# Patient Record
Sex: FEMALE | Race: WHITE | NOT HISPANIC OR LATINO | Employment: FULL TIME | URBAN - NONMETROPOLITAN AREA
[De-identification: names, ages, dates, MRNs, and addresses within clinical notes are randomized per-mention and may not be internally consistent; named-entity substitution may affect disease eponyms.]

---

## 2017-01-03 DIAGNOSIS — B00.1 HERPES LABIALIS: ICD-10-CM

## 2017-01-03 RX ORDER — VALACYCLOVIR HYDROCHLORIDE 1 G/1
TABLET, FILM COATED ORAL
Qty: 4 TABLET | Refills: 0 | Status: SHIPPED | OUTPATIENT
Start: 2017-01-03 | End: 2017-03-17 | Stop reason: SDUPTHER

## 2017-01-03 NOTE — TELEPHONE ENCOUNTER
Pt has not been seen in office sine March 2016. Pt does not have follow up scheduled. Pt was asked to schedule follow up 10/2016, did not follow through

## 2017-03-14 PROBLEM — B00.1 HERPES LABIALIS: Status: ACTIVE | Noted: 2017-03-14

## 2017-03-14 PROBLEM — B97.7 HPV (HUMAN PAPILLOMA VIRUS) INFECTION: Status: ACTIVE | Noted: 2017-03-14

## 2017-03-17 DIAGNOSIS — B00.1 HERPES LABIALIS: ICD-10-CM

## 2017-03-17 RX ORDER — VALACYCLOVIR HYDROCHLORIDE 1 G/1
TABLET, FILM COATED ORAL
Qty: 4 TABLET | Refills: 0 | Status: SHIPPED | OUTPATIENT
Start: 2017-03-17 | End: 2019-02-13

## 2019-02-13 ENCOUNTER — PROCEDURE VISIT (OUTPATIENT)
Dept: OBSTETRICS AND GYNECOLOGY | Facility: CLINIC | Age: 39
End: 2019-02-13

## 2019-02-13 VITALS
SYSTOLIC BLOOD PRESSURE: 112 MMHG | BODY MASS INDEX: 24.01 KG/M2 | WEIGHT: 153 LBS | HEIGHT: 67 IN | DIASTOLIC BLOOD PRESSURE: 66 MMHG

## 2019-02-13 DIAGNOSIS — Z01.419 ENCOUNTER FOR GYNECOLOGICAL EXAMINATION WITHOUT ABNORMAL FINDING: Primary | ICD-10-CM

## 2019-02-13 PROCEDURE — 99396 PREV VISIT EST AGE 40-64: CPT | Performed by: OBSTETRICS & GYNECOLOGY

## 2019-02-13 NOTE — PROGRESS NOTES
Subjective   Chief Complaint   Patient presents with   • Gynecologic Exam     Last pap 2016 WNl, Hx of abnormal paps, Cryo     Cristal Fleming is a 38 y.o. year old  presenting to be seen for her annual exam. Cycles  usally regular q 27 days. Last month had one week premenstrual bleeding leading up to cycle.     SEXUAL Hx:  She is currently sexually active.  In the past year there has not been new sexual partners.    Condoms are not typically used.  She would not like to be screened for STD's at today's exam.  Current birth control method: vasectomy.  MENSTRUAL Hx:  Patient's last menstrual period was 2019.  In the past 6 months her cycles have been regular, predictable and occur monthly.   Her menstrual flow is normal.   Each month on average there are roughly 0 days of very heavy flow.    Intermenstrual bleeding is absent.    Post-coital bleeding is absent.  Dysmenorrhea: is not affecting her activities of daily living  PMS: is not affecting her activities of daily living  Her cycles are not a source of concern for her that she wishes to discuss today.  HEALTH Hx:  She exercises regularly: no (and has no plans to become more active).  She wears her seat belt:yes.  She has concerns about domestic violence: no.  OTHER COMPLAINTS:  Nothing else     The following portions of the patient's history were reviewed and updated as appropriate:  She  has a past medical history of Rectal discomfort.  She does not have any pertinent problems on file.  She  has a past surgical history that includes Breast surgery and Other surgical history.  Her family history includes Breast cancer in her paternal aunt; Cancer in her paternal grandfather; Diabetes in her maternal grandfather and maternal grandmother; Heart disease in her paternal grandmother; Lung cancer in her paternal aunt.  She  reports that  has never smoked. she has never used smokeless tobacco. She reports that she drinks alcohol. She reports that she does not  "use drugs.  Current Outpatient Medications   Medication Sig Dispense Refill   • hydrocortisone (PROCTOSOL HC) 2.5 % rectal cream Proctosol HC 2.5 % Rectal Cream; Patient Sig: Proctosol HC 2.5 % Rectal Cream apply to outside of rectum twice daily as needed for pain, and after BMs. use for only 1 week; 15; 0; 07-Mar-2016; Active       No current facility-administered medications for this visit.      Current Outpatient Medications on File Prior to Visit   Medication Sig   • hydrocortisone (PROCTOSOL HC) 2.5 % rectal cream Proctosol HC 2.5 % Rectal Cream; Patient Sig: Proctosol HC 2.5 % Rectal Cream apply to outside of rectum twice daily as needed for pain, and after BMs. use for only 1 week; 15; 0; 07-Mar-2016; Active   • [DISCONTINUED] valACYclovir (VALTREX) 1000 MG tablet TAKE TWO TABLETS BY MOUTH AT FIRST SIGN OF OUTBREAK AND THEN REPEAT IN 12 HOURS     No current facility-administered medications on file prior to visit.      She has No Known Allergies..    Social History    Tobacco Use      Smoking status: Never Smoker      Smokeless tobacco: Never Used    Review of Systems  Consitutional NEG: anorexia or night sweats    POS: nothing reported   Gastointestinal NEG: bloating, change in bowel habits, melena or reflux symptoms    POS: nothing reported   Genitourinary NEG: dysuria or hematuria    POS: nothing reported   Integument NEG: moles that are changing in size, shape, color or rashes    POS: nothing reported   Breast NEG: persistent breast lump, skin dimpling or nipple discharge    POS: nothing reported          Objective   /66   Ht 170.2 cm (67\")   Wt 69.4 kg (153 lb)   LMP 01/27/2019   BMI 23.96 kg/m²     General:  well developed; well nourished  no acute distress   Skin:  No suspicious lesions seen   Thyroid: normal to inspection and palpation   Breasts:  Examined in supine position  Symmetric without masses or skin dimpling  Nipples normal without inversion, lesions or discharge  There are no " palpable axillary nodes   Abdomen: soft, non-tender; no masses  no umbilical or inguinal hernias are present  no hepato-splenomegaly   Pelvis: Clinical staff was present for exam  External genitalia:  normal appearance of the external genitalia including Bartholin's and [place]'s glands.  :  urethral meatus normal;  Vaginal:  normal pink mucosa without prolapse or lesions.  Cervix:  normal appearance.  Uterus:  normal size, shape and consistency.  Adnexa:  normal bimanual exam of the adnexa.  Rectal:  digital rectal exam not performed; anus visually normal appearing.        Assessment   1. Normal PE     Plan   1. PAP done  2. MMG  3. Follow up     No orders of the defined types were placed in this encounter.         This note was electronically signed.      February 13, 2019

## 2019-02-19 DIAGNOSIS — Z01.419 ENCOUNTER FOR GYNECOLOGICAL EXAMINATION WITHOUT ABNORMAL FINDING: ICD-10-CM

## 2019-03-18 ENCOUNTER — TELEPHONE (OUTPATIENT)
Dept: OBSTETRICS AND GYNECOLOGY | Facility: CLINIC | Age: 39
End: 2019-03-18

## 2019-03-18 NOTE — TELEPHONE ENCOUNTER
Contacted patient regarding overdue MMG. She was transferred to Central Scheduling to get that scheduled. Order postponed

## 2019-03-26 ENCOUNTER — APPOINTMENT (OUTPATIENT)
Dept: MAMMOGRAPHY | Facility: HOSPITAL | Age: 39
End: 2019-03-26

## 2019-03-27 ENCOUNTER — HOSPITAL ENCOUNTER (OUTPATIENT)
Dept: MAMMOGRAPHY | Facility: HOSPITAL | Age: 39
Discharge: HOME | End: 2019-03-27
Admitting: OBSTETRICS & GYNECOLOGY

## 2019-03-27 DIAGNOSIS — Z01.419 ENCOUNTER FOR GYNECOLOGICAL EXAMINATION WITHOUT ABNORMAL FINDING: ICD-10-CM

## 2019-03-27 PROCEDURE — 77063 BREAST TOMOSYNTHESIS BI: CPT

## 2019-03-27 PROCEDURE — 77067 SCR MAMMO BI INCL CAD: CPT

## 2023-05-15 ENCOUNTER — TRANSCRIBE ORDERS (OUTPATIENT)
Dept: ADMINISTRATIVE | Facility: HOSPITAL | Age: 43
End: 2023-05-15
Payer: COMMERCIAL

## 2023-05-15 DIAGNOSIS — Z12.31 VISIT FOR SCREENING MAMMOGRAM: Primary | ICD-10-CM

## 2023-11-16 ENCOUNTER — HOSPITAL ENCOUNTER (OUTPATIENT)
Dept: MAMMOGRAPHY | Facility: HOSPITAL | Age: 43
Discharge: HOME | End: 2023-11-16
Admitting: OBSTETRICS & GYNECOLOGY
Payer: COMMERCIAL

## 2023-11-16 DIAGNOSIS — Z12.31 VISIT FOR SCREENING MAMMOGRAM: ICD-10-CM

## 2023-11-16 PROCEDURE — 77067 SCR MAMMO BI INCL CAD: CPT

## 2023-11-16 PROCEDURE — 77063 BREAST TOMOSYNTHESIS BI: CPT

## 2024-07-26 ENCOUNTER — TRANSCRIBE ORDERS (OUTPATIENT)
Dept: ADMINISTRATIVE | Facility: HOSPITAL | Age: 44
End: 2024-07-26
Payer: COMMERCIAL

## 2024-07-26 ENCOUNTER — HOSPITAL ENCOUNTER (OUTPATIENT)
Dept: ULTRASOUND IMAGING | Facility: HOSPITAL | Age: 44
Discharge: HOME | End: 2024-07-26
Payer: COMMERCIAL

## 2024-07-26 DIAGNOSIS — R60.0 LOCALIZED EDEMA: Primary | ICD-10-CM

## 2024-07-26 DIAGNOSIS — R60.0 LOCALIZED EDEMA: ICD-10-CM

## 2024-07-26 DIAGNOSIS — M79.605 LEFT LEG PAIN: ICD-10-CM

## 2024-07-26 PROCEDURE — 93971 EXTREMITY STUDY: CPT

## 2025-03-27 ENCOUNTER — OFFICE VISIT (OUTPATIENT)
Dept: OBSTETRICS AND GYNECOLOGY | Facility: CLINIC | Age: 45
End: 2025-03-27
Payer: OTHER GOVERNMENT

## 2025-03-27 VITALS
BODY MASS INDEX: 26.65 KG/M2 | WEIGHT: 169.8 LBS | DIASTOLIC BLOOD PRESSURE: 60 MMHG | SYSTOLIC BLOOD PRESSURE: 102 MMHG | HEIGHT: 67 IN

## 2025-03-27 DIAGNOSIS — Z01.419 WOMEN'S ANNUAL ROUTINE GYNECOLOGICAL EXAMINATION: Primary | ICD-10-CM

## 2025-03-27 DIAGNOSIS — N89.8 VAGINAL ODOR: ICD-10-CM

## 2025-03-27 PROCEDURE — 99386 PREV VISIT NEW AGE 40-64: CPT | Performed by: OBSTETRICS & GYNECOLOGY

## 2025-03-27 NOTE — PROGRESS NOTES
Gynecologic Annual Exam Note          GYN Annual Exam     Gynecologic Exam and Establish Care        Subjective     HPI  Cristal Fleming is a 44 y.o. female, , who presents for annual well woman exam as a new patient. There were no changes to her medical or surgical history since her last visit..  Patient's last menstrual period was 2025.  Her periods occur every 28-30 days, lasting 5 days.  The flow is moderate. She reports dysmenorrhea is moderate occurring first 1-2 days of flow. Marital Status: . She is sexually active. She has not had new partners.. STD testing recommendations have been explained to the patient and she declines STD testing.    The patient would like to discuss the following complaints today: sometimes has some vaginal odor. Denies any abnormal discharge or irritation. States she uses a feminine wash and occasionally uses Vagisil cream     Additional OB/GYN History   contraceptive methods: Vasectomy   Desires to: do not start contraception  History of migraines: no    Last Pap : 23. Result: negative. HPV: negative.   Last Completed Pap Smear    This patient has no relevant Health Maintenance data.       History of abnormal Pap smear: yes - in , normal since  Family history of uterine, colon, breast, or ovarian cancer: yes - paternal aunt- breast  Performs monthly Self-Breast Exam: no  Last mammogram: 2023. Done at . There is a copy in the chart.    Last Completed Mammogram            Awaiting Completion       MAMMOGRAM (Every 2 Years) Order placed this encounter      2025  Order placed for Mammo Screening Digital Tomosynthesis Bilateral With CAD by Snow Gil MD    2023  Mammo Screening Digital Tomosynthesis Bilateral With CAD    2019  Mammo Screening Digital Tomosynthesis Bilateral With CAD                            Colonoscopy: has had a colonoscopy 5 years ago  Exercises Regularly: yes  Feelings of Anxiety or Depression:  "no  Tobacco Usage?: No     No current outpatient medications on file.     Patient denies the need for medication refills today.    OB History          2    Para   2    Term   2            AB        Living   2         SAB        IAB        Ectopic        Molar        Multiple        Live Births   2                Past Medical History:   Diagnosis Date    Abnormal Pap smear of cervix     Normal since    Rectal discomfort         Past Surgical History:   Procedure Laterality Date    AUGMENTATION MAMMAPLASTY      BREAST SURGERY      Enlargement Procedure    OTHER SURGICAL HISTORY      Cervix Cryosurgery    WISDOM TOOTH EXTRACTION      No issues       Health Maintenance   Topic Date Due    TDAP/TD VACCINES (1 - Tdap) Never done    HEPATITIS C SCREENING  Never done    ANNUAL PHYSICAL  Never done    Annual Gynecologic Pelvic and Breast Exam  2020    PAP SMEAR  2022    COLORECTAL CANCER SCREENING  Never done    INFLUENZA VACCINE  Never done    COVID-19 Vaccine ( season) 2024    MAMMOGRAM  2025    Pneumococcal Vaccine 0-49  Aged Out       The additional following portions of the patient's history were reviewed and updated as appropriate: allergies, current medications, past family history, past medical history, past social history, past surgical history, and problem list.    Review of Systems   Constitutional: Negative.    HENT: Negative.     Eyes: Negative.    Respiratory: Negative.     Cardiovascular: Negative.    Gastrointestinal: Negative.    Endocrine: Negative.    Genitourinary:         Vaginal odor   Musculoskeletal: Negative.    Skin: Negative.    Allergic/Immunologic: Negative.    Neurological: Negative.    Hematological: Negative.    Psychiatric/Behavioral: Negative.           I have reviewed and agree with the HPI, ROS, and historical information as entered above. Snow Gil MD          Objective   /60   Ht 170.2 cm (67\")   Wt 77 kg (169 lb 12.8 " oz)   LMP 03/06/2025   BMI 26.59 kg/m²     Physical Exam  Vitals and nursing note reviewed. Exam conducted with a chaperone present.   Constitutional:       Appearance: She is well-developed.   HENT:      Head: Normocephalic and atraumatic.   Eyes:      Pupils: Pupils are equal, round, and reactive to light.   Neck:      Thyroid: No thyroid mass or thyromegaly.   Pulmonary:      Effort: Pulmonary effort is normal. No retractions.   Chest:      Chest wall: No mass.   Breasts:     Right: Normal. No mass, nipple discharge, skin change or tenderness.      Left: Normal. No mass, nipple discharge, skin change or tenderness.   Abdominal:      General: Bowel sounds are normal.      Palpations: Abdomen is soft. Abdomen is not rigid. There is no mass.      Tenderness: There is no abdominal tenderness. There is no guarding.      Hernia: No hernia is present. There is no hernia in the left inguinal area or right inguinal area.   Genitourinary:     Exam position: Lithotomy position.      Pubic Area: No rash.       Labia:         Right: No rash, tenderness or lesion.         Left: No rash, tenderness or lesion.       Urethra: No urethral pain or urethral swelling.      Vagina: Normal. No vaginal discharge or lesions.      Cervix: No cervical motion tenderness, discharge, lesion or cervical bleeding.      Uterus: Normal. Not enlarged, not fixed and not tender.       Adnexa:         Right: No mass, tenderness or fullness.          Left: No mass, tenderness or fullness.        Rectum: No external hemorrhoid.   Musculoskeletal:      Cervical back: Normal range of motion. No muscular tenderness.      Right lower leg: No edema.      Left lower leg: No edema.   Skin:     General: Skin is warm and dry.   Neurological:      Mental Status: She is alert and oriented to person, place, and time.      Motor: Motor function is intact.   Psychiatric:         Mood and Affect: Mood and affect normal.         Behavior: Behavior normal.             Assessment and Plan    Problem List Items Addressed This Visit    None  Visit Diagnoses         Women's annual routine gynecological examination    -  Primary    Relevant Orders    Mammo Screening Digital Tomosynthesis Bilateral With CAD      Vaginal odor        Relevant Orders    NuSwab VG, Candida 6sp - Swab, Cervix, Vagina            GYN annual well woman exam.   Pap guidelines reviewed.  Nuswab done today. Vulvar hygiene products discussed.   Reviewed monthly self breast exams.  Instructed to call with lumps, pain, or breast discharge.    Ordered Mammogram today  Reviewed exercise as a preventative health measures.   Return in about 1 year (around 3/27/2026) for Annual physical.     Snow Gil MD  03/27/2025

## 2025-03-31 LAB
A VAGINAE DNA VAG QL NAA+PROBE: ABNORMAL SCORE
BVAB2 DNA VAG QL NAA+PROBE: ABNORMAL SCORE
C ALBICANS DNA VAG QL NAA+PROBE: NEGATIVE
C GLABRATA DNA VAG QL NAA+PROBE: NEGATIVE
C KRUSEI DNA VAG QL NAA+PROBE: NEGATIVE
C LUSITANIAE DNA VAG QL NAA+PROBE: NEGATIVE
CANDIDA DNA VAG QL NAA+PROBE: NEGATIVE
MEGA1 DNA VAG QL NAA+PROBE: ABNORMAL SCORE
T VAGINALIS DNA VAG QL NAA+PROBE: NEGATIVE